# Patient Record
Sex: FEMALE | Race: BLACK OR AFRICAN AMERICAN | URBAN - METROPOLITAN AREA
[De-identification: names, ages, dates, MRNs, and addresses within clinical notes are randomized per-mention and may not be internally consistent; named-entity substitution may affect disease eponyms.]

---

## 2023-07-18 ENCOUNTER — WEB ENCOUNTER (OUTPATIENT)
Dept: URBAN - METROPOLITAN AREA CLINIC 92 | Facility: CLINIC | Age: 73
End: 2023-07-18

## 2023-07-18 ENCOUNTER — OFFICE VISIT (OUTPATIENT)
Dept: URBAN - METROPOLITAN AREA CLINIC 92 | Facility: CLINIC | Age: 73
End: 2023-07-18
Payer: MEDICARE

## 2023-07-18 VITALS
HEART RATE: 72 BPM | DIASTOLIC BLOOD PRESSURE: 84 MMHG | BODY MASS INDEX: 32.99 KG/M2 | TEMPERATURE: 96.6 F | HEIGHT: 65 IN | SYSTOLIC BLOOD PRESSURE: 164 MMHG | WEIGHT: 198 LBS

## 2023-07-18 DIAGNOSIS — R15.1 FECAL SMEARING: ICD-10-CM

## 2023-07-18 DIAGNOSIS — R19.7 DIARRHEA, UNSPECIFIED TYPE: ICD-10-CM

## 2023-07-18 PROCEDURE — 99204 OFFICE O/P NEW MOD 45 MIN: CPT

## 2023-07-18 NOTE — PHYSICAL EXAM HENT:
Head,  normocephalic,  atraumatic,  Face,  Face within normal limits , Head, normocephalic, atraumatic, Face, Face within normal limits, Ears, External ears within normal limits

## 2023-07-18 NOTE — PHYSICAL EXAM GASTROINTESTINAL
Abdomen,  soft, nontender, nondistended,  no guarding or rigidity,  no masses palpable,  normal bowel sounds Liver and Spleen,no hepatosplenomegaly , Abdomen , soft, nontender, nondistended , no guarding or rigidity , no masses palpable , normal bowel sounds , Liver and Spleen,  no hepatosplenomegaly , liver nontender

## 2023-07-18 NOTE — PHYSICAL EXAM CONSTITUTIONAL:
normal,  alert,  in no acute distress,  well developed, well nourished,  ambulating without difficulty,  normal communication ability , well developed, well nourished , in no acute distress , ambulating without difficulty , normal communication ability

## 2023-07-18 NOTE — HPI-TODAY'S VISIT:
Pt is a 71 year old patient who presents for diarrhea. She reports of occasional diarrhea since her breast cancer dx in 2018 went through chemo and radiation, in remission now but started to develop urgency earlier this year. Salads can worsen her diarrhea. Two weeks ago she had an episode of fecal incontinence at the store, since then she has had two episodes of diarrhea but now having a regular BM QD. She notes increased urgency and nightime sx. She has Imodium  and only took it twice. Denies hematochezia and abdominal pain. She had one episode of melena 4 months ago. Has occasional reflux but largely controlled with Nexium 40mg prn, 2-3 times weekly. Denies dysphagia, regurgitation, weight changes. No NSAID use.   Patient had knee surgery in January and notes her urgency began afterwards.   Patient's last colonoscopy done in 2018 with Dr. Osman Marrero at [place], normal per patient.   PCP Dr. Weller. Lab work done last month. Stress test done last month normal per patient.   Denies family history colon cancer or IBD.

## 2023-07-18 NOTE — PHYSICAL EXAM CHEST:
normal breath sounds. , chest wall non-tender, breathing is unlabored without accessory muscle use, normal breath sounds

## 2023-07-19 PROBLEM — 62315008: Status: ACTIVE | Noted: 2023-07-19

## 2023-08-01 LAB — PANCREATIC ELASTASE, FECAL: 181

## 2023-08-02 ENCOUNTER — TELEPHONE ENCOUNTER (OUTPATIENT)
Dept: URBAN - METROPOLITAN AREA CLINIC 92 | Facility: CLINIC | Age: 73
End: 2023-08-02

## 2023-08-02 RX ORDER — PANCRELIPASE 36000; 180000; 114000 [USP'U]/1; [USP'U]/1; [USP'U]/1
AS DIRECTED CAPSULE, DELAYED RELEASE PELLETS ORAL
Qty: 800 | Refills: 1 | OUTPATIENT
Start: 2023-08-02 | End: 2024-01-28

## 2023-08-03 ENCOUNTER — ERX REFILL RESPONSE (OUTPATIENT)
Dept: URBAN - METROPOLITAN AREA CLINIC 92 | Facility: CLINIC | Age: 73
End: 2023-08-03

## 2023-08-03 RX ORDER — PANCRELIPASE 36000; 180000; 114000 [USP'U]/1; [USP'U]/1; [USP'U]/1
TAKE TWO CAPSULES BY MOUTH THREE TIMES A DAY WITH MEALS AND 1 CAPSULE WITH SNACKS CAPSULE, DELAYED RELEASE PELLETS ORAL
Qty: 800 CAPSULE | Refills: 0 | OUTPATIENT

## 2023-08-03 RX ORDER — PANCRELIPASE 36000; 180000; 114000 [USP'U]/1; [USP'U]/1; [USP'U]/1
AS DIRECTED CAPSULE, DELAYED RELEASE PELLETS ORAL
Qty: 800 | Refills: 1 | OUTPATIENT

## 2023-08-18 ENCOUNTER — CLAIMS CREATED FROM THE CLAIM WINDOW (OUTPATIENT)
Dept: URBAN - METROPOLITAN AREA CLINIC 4 | Facility: CLINIC | Age: 73
End: 2023-08-18
Payer: MEDICARE

## 2023-08-18 ENCOUNTER — CLAIMS CREATED FROM THE CLAIM WINDOW (OUTPATIENT)
Dept: URBAN - METROPOLITAN AREA SURGERY CENTER 16 | Facility: SURGERY CENTER | Age: 73
End: 2023-08-18

## 2023-08-18 ENCOUNTER — CLAIMS CREATED FROM THE CLAIM WINDOW (OUTPATIENT)
Dept: URBAN - METROPOLITAN AREA SURGERY CENTER 16 | Facility: SURGERY CENTER | Age: 73
End: 2023-08-18
Payer: MEDICARE

## 2023-08-18 DIAGNOSIS — K63.89 OTHER SPECIFIED DISEASES OF INTESTINE: ICD-10-CM

## 2023-08-18 DIAGNOSIS — R19.7 ACUTE DIARRHEA: ICD-10-CM

## 2023-08-18 PROCEDURE — 88305 TISSUE EXAM BY PATHOLOGIST: CPT | Performed by: PATHOLOGY

## 2023-08-18 PROCEDURE — 88342 IMHCHEM/IMCYTCHM 1ST ANTB: CPT | Performed by: PATHOLOGY

## 2023-08-18 PROCEDURE — 45380 COLONOSCOPY AND BIOPSY: CPT | Performed by: INTERNAL MEDICINE

## 2023-08-18 PROCEDURE — 88313 SPECIAL STAINS GROUP 2: CPT | Performed by: PATHOLOGY

## 2023-08-18 PROCEDURE — G8907 PT DOC NO EVENTS ON DISCHARG: HCPCS | Performed by: INTERNAL MEDICINE

## 2023-09-06 ENCOUNTER — OFFICE VISIT (OUTPATIENT)
Dept: URBAN - METROPOLITAN AREA CLINIC 92 | Facility: CLINIC | Age: 73
End: 2023-09-06
Payer: MEDICARE

## 2023-09-06 VITALS
HEART RATE: 67 BPM | TEMPERATURE: 96.1 F | HEIGHT: 65 IN | SYSTOLIC BLOOD PRESSURE: 152 MMHG | DIASTOLIC BLOOD PRESSURE: 85 MMHG | WEIGHT: 198 LBS | BODY MASS INDEX: 32.99 KG/M2

## 2023-09-06 DIAGNOSIS — R19.7 DIARRHEA, UNSPECIFIED TYPE: ICD-10-CM

## 2023-09-06 DIAGNOSIS — K86.81 EXOCRINE PANCREATIC INSUFFICIENCY: ICD-10-CM

## 2023-09-06 PROCEDURE — 99213 OFFICE O/P EST LOW 20 MIN: CPT

## 2023-09-06 RX ORDER — PANCRELIPASE 36000; 180000; 114000 [USP'U]/1; [USP'U]/1; [USP'U]/1
TAKE TWO CAPSULES BY MOUTH THREE TIMES A DAY WITH MEALS AND 1 CAPSULE WITH SNACKS CAPSULE, DELAYED RELEASE PELLETS ORAL
Qty: 800 CAPSULE | Refills: 0 | Status: ACTIVE | COMMUNITY

## 2023-09-06 NOTE — HPI-TODAY'S VISIT:
Patient is a 73 year old female who presents in follow up for diarrhea.       She reports of occasional diarrhea since her breast cancer dx in 2018 went through chemo and radiation, in remission now but started to develop urgency earlier this year. Salads can worsen her diarrhea. She had an episode of fecal incontinence at the store, since then she has had two episodes of diarrhea but now having a regular BM QD. She notes increased urgency and nightime sx. Patient had knee surgery in January and notes her urgency began afterwards. She has Imodium and only took it twice. Denies hematochezia and abdominal pain. She had one episode of melena 4 months ago. Pancreatic elastase measured 181. Since being on Creon patient reports her diarrhea improved and the urgency has subsided. Patient's last CT scan was 2-3 years ago for her upper abdominal pain revealing a hernia       Has occasional reflux but largely controlled with Nexium 40mg prn, 2-3 times weekly. Denies dysphagia, regurgitation, weight changes. No NSAID use.      Most recent colonoscopy on 8/18/23 revealed IH diverticulosis in entire examined colon, ileal diverticula, random colon bx shows no significant abnormality. Colonoscopy done in 2018 with Dr. Osman Marrero at [place], normal per patient. Denies family history colon cancer or IBD.    Stress test done last month normal per patient.           .

## 2023-09-18 ENCOUNTER — TELEPHONE ENCOUNTER (OUTPATIENT)
Dept: URBAN - METROPOLITAN AREA CLINIC 23 | Facility: CLINIC | Age: 73
End: 2023-09-18

## 2023-10-09 ENCOUNTER — TELEPHONE ENCOUNTER (OUTPATIENT)
Dept: URBAN - METROPOLITAN AREA CLINIC 92 | Facility: CLINIC | Age: 73
End: 2023-10-09

## 2023-10-12 ENCOUNTER — LAB OUTSIDE AN ENCOUNTER (OUTPATIENT)
Dept: URBAN - METROPOLITAN AREA CLINIC 92 | Facility: CLINIC | Age: 73
End: 2023-10-12

## 2023-10-17 ENCOUNTER — LAB OUTSIDE AN ENCOUNTER (OUTPATIENT)
Dept: URBAN - METROPOLITAN AREA CLINIC 92 | Facility: CLINIC | Age: 73
End: 2023-10-17

## 2023-10-30 ENCOUNTER — TELEPHONE ENCOUNTER (OUTPATIENT)
Dept: URBAN - METROPOLITAN AREA CLINIC 63 | Facility: CLINIC | Age: 73
End: 2023-10-30

## 2023-11-06 ENCOUNTER — OFFICE VISIT (OUTPATIENT)
Dept: URBAN - METROPOLITAN AREA CLINIC 92 | Facility: CLINIC | Age: 73
End: 2023-11-06
Payer: MEDICARE

## 2023-11-06 VITALS
HEART RATE: 81 BPM | WEIGHT: 204 LBS | BODY MASS INDEX: 33.99 KG/M2 | HEIGHT: 65 IN | TEMPERATURE: 96.8 F | DIASTOLIC BLOOD PRESSURE: 96 MMHG | SYSTOLIC BLOOD PRESSURE: 173 MMHG

## 2023-11-06 DIAGNOSIS — K86.81 EXOCRINE PANCREATIC INSUFFICIENCY: ICD-10-CM

## 2023-11-06 DIAGNOSIS — R19.7 DIARRHEA, UNSPECIFIED TYPE: ICD-10-CM

## 2023-11-06 PROCEDURE — 99213 OFFICE O/P EST LOW 20 MIN: CPT

## 2023-11-06 RX ORDER — PANCRELIPASE 36000; 180000; 114000 [USP'U]/1; [USP'U]/1; [USP'U]/1
TAKE TWO CAPSULES BY MOUTH THREE TIMES A DAY WITH MEALS AND 1 CAPSULE WITH SNACKS CAPSULE, DELAYED RELEASE PELLETS ORAL
Qty: 800 CAPSULE | Refills: 0 | Status: ON HOLD | COMMUNITY

## 2023-11-06 NOTE — HPI-TODAY'S VISIT:
Patient is a 73 year old female who presents in follow up for diarrhea.   Previously reported of occasional diarrhea since her breast cancer dx in 2018 went through chemo and radiation, in remission now but started to develop urgency earlier this year. Salads worsened her diarrhea. She had an episode of fecal incontinence at the store. Noted of increased urgency and nightime sx. Patient had knee surgery in January and notes her urgency began afterwards. Took Imodium twice. Denies hematochezia and abdominal pain. She had one episode of melena 4 months ago. Pancreatic elastase measured 181. Since being on Creon patient reports her diarrhea improved and the urgency has subsided. Patient's last CT scan was 2-3 years ago for her upper abdominal pain revealing a hernia. Occasional reflux but largely controlled with Nexium 40mg prn, 2-3 times weekly. No NSAID use.       Most recent colonoscopy on 8/18/23 revealed IH diverticulosis in entire examined colon, ileal diverticula, random colon bx shows no significant abnormality. Colonoscopy done in 2018 with Dr. Osman Marrero at [place], normal per patient. Denies family history colon cancer or IBD.    Stress test done last month normal per patient.  Today, patient reports her diarrhea resolved. She stopped Creon because she did not like taking so many pills. Even off of Creon she has no diarrhea. Denies urgency, abdominal pain, constipation, hematochezia, or melena. Denies upper GI issues. Patient notes of some weight gain, 6 lbs since her previous visit.

## 2023-12-04 ENCOUNTER — DASHBOARD ENCOUNTERS (OUTPATIENT)
Age: 73
End: 2023-12-04

## 2023-12-06 ENCOUNTER — OFFICE VISIT (OUTPATIENT)
Dept: URBAN - METROPOLITAN AREA CLINIC 92 | Facility: CLINIC | Age: 73
End: 2023-12-06

## 2023-12-06 RX ORDER — PANCRELIPASE 36000; 180000; 114000 [USP'U]/1; [USP'U]/1; [USP'U]/1
TAKE TWO CAPSULES BY MOUTH THREE TIMES A DAY WITH MEALS AND 1 CAPSULE WITH SNACKS CAPSULE, DELAYED RELEASE PELLETS ORAL
Qty: 800 CAPSULE | Refills: 0 | COMMUNITY